# Patient Record
Sex: MALE | Race: WHITE | NOT HISPANIC OR LATINO | Employment: FULL TIME | URBAN - NONMETROPOLITAN AREA
[De-identification: names, ages, dates, MRNs, and addresses within clinical notes are randomized per-mention and may not be internally consistent; named-entity substitution may affect disease eponyms.]

---

## 2017-12-15 ENCOUNTER — OFFICE VISIT (OUTPATIENT)
Dept: FAMILY MEDICINE CLINIC | Facility: CLINIC | Age: 42
End: 2017-12-15

## 2017-12-15 VITALS
BODY MASS INDEX: 37.99 KG/M2 | HEIGHT: 65 IN | DIASTOLIC BLOOD PRESSURE: 76 MMHG | WEIGHT: 228 LBS | SYSTOLIC BLOOD PRESSURE: 122 MMHG | HEART RATE: 72 BPM

## 2017-12-15 DIAGNOSIS — Z02.89 ENCOUNTER FOR EXAMINATION REQUIRED BY DEPARTMENT OF TRANSPORTATION (DOT): Primary | ICD-10-CM

## 2017-12-15 PROCEDURE — DOTPHY: Performed by: INTERNAL MEDICINE

## 2017-12-15 NOTE — PATIENT INSTRUCTIONS

## 2017-12-15 NOTE — PROGRESS NOTES
Mark Wilson is a 42 y.o. male who presents to the office for a DOT Exam. See Federal DOT examination form for details of this visit.

## 2019-02-22 ENCOUNTER — OFFICE VISIT (OUTPATIENT)
Dept: FAMILY MEDICINE CLINIC | Facility: CLINIC | Age: 44
End: 2019-02-22

## 2019-02-22 ENCOUNTER — HOSPITAL ENCOUNTER (EMERGENCY)
Facility: HOSPITAL | Age: 44
Discharge: HOME | End: 2019-02-22
Attending: EMERGENCY MEDICINE | Admitting: EMERGENCY MEDICINE

## 2019-02-22 ENCOUNTER — APPOINTMENT (OUTPATIENT)
Dept: GENERAL RADIOLOGY | Facility: HOSPITAL | Age: 44
End: 2019-02-22

## 2019-02-22 VITALS
HEIGHT: 71 IN | SYSTOLIC BLOOD PRESSURE: 142 MMHG | BODY MASS INDEX: 29.94 KG/M2 | HEART RATE: 96 BPM | DIASTOLIC BLOOD PRESSURE: 97 MMHG | TEMPERATURE: 97.7 F | RESPIRATION RATE: 20 BRPM | WEIGHT: 213.85 LBS | OXYGEN SATURATION: 98 %

## 2019-02-22 VITALS
HEART RATE: 110 BPM | OXYGEN SATURATION: 98 % | SYSTOLIC BLOOD PRESSURE: 144 MMHG | BODY MASS INDEX: 30.24 KG/M2 | HEIGHT: 71 IN | DIASTOLIC BLOOD PRESSURE: 90 MMHG | WEIGHT: 216 LBS

## 2019-02-22 DIAGNOSIS — R53.81 MALAISE: ICD-10-CM

## 2019-02-22 DIAGNOSIS — I10 HYPERTENSION, UNSPECIFIED TYPE: Primary | ICD-10-CM

## 2019-02-22 DIAGNOSIS — R94.31 ABNORMAL EKG: ICD-10-CM

## 2019-02-22 DIAGNOSIS — R53.1 WEAKNESS: Primary | ICD-10-CM

## 2019-02-22 DIAGNOSIS — R94.31 ABNORMAL FINDING ON EKG: ICD-10-CM

## 2019-02-22 DIAGNOSIS — R03.0 ELEVATED BLOOD PRESSURE READING: ICD-10-CM

## 2019-02-22 LAB
ALBUMIN SERPL-MCNC: 4.4 G/DL (ref 3.4–4.8)
ALBUMIN/GLOB SERPL: 1.2 G/DL (ref 1.1–1.8)
ALP SERPL-CCNC: 68 U/L (ref 38–126)
ALT SERPL W P-5'-P-CCNC: 30 U/L (ref 21–72)
ANION GAP SERPL CALCULATED.3IONS-SCNC: 9 MMOL/L (ref 5–15)
AST SERPL-CCNC: 26 U/L (ref 17–59)
BASOPHILS # BLD AUTO: 0.08 10*3/MM3 (ref 0–0.2)
BASOPHILS NFR BLD AUTO: 0.7 % (ref 0–1.5)
BILIRUB SERPL-MCNC: 0.2 MG/DL (ref 0.2–1.3)
BILIRUB UR QL STRIP: NEGATIVE
BUN BLD-MCNC: 20 MG/DL (ref 7–21)
BUN/CREAT SERPL: 16.1 (ref 7–25)
CALCIUM SPEC-SCNC: 9.7 MG/DL (ref 8.4–10.2)
CHLORIDE SERPL-SCNC: 96 MMOL/L (ref 95–110)
CK MB SERPL-CCNC: 0.67 NG/ML (ref 0–5)
CK SERPL-CCNC: 61 U/L (ref 55–170)
CLARITY UR: CLEAR
CO2 SERPL-SCNC: 30 MMOL/L (ref 22–31)
COLOR UR: YELLOW
CREAT BLD-MCNC: 1.24 MG/DL (ref 0.7–1.3)
DEPRECATED RDW RBC AUTO: 40.6 FL (ref 37–54)
EOSINOPHIL # BLD AUTO: 0.39 10*3/MM3 (ref 0–0.4)
EOSINOPHIL NFR BLD AUTO: 3.5 % (ref 0.3–6.2)
ERYTHROCYTE [DISTWIDTH] IN BLOOD BY AUTOMATED COUNT: 12.4 % (ref 12.3–15.4)
ETHANOL BLD-MCNC: <10 MG/DL (ref 0–10)
ETHANOL UR QL: <0.01 %
GFR SERPL CREATININE-BSD FRML MDRD: 63 ML/MIN/1.73 (ref 63–147)
GLOBULIN UR ELPH-MCNC: 3.6 GM/DL (ref 2.3–3.5)
GLUCOSE BLD-MCNC: 87 MG/DL (ref 60–100)
GLUCOSE UR STRIP-MCNC: NEGATIVE MG/DL
HCT VFR BLD AUTO: 47.4 % (ref 37.5–51)
HGB BLD-MCNC: 16.2 G/DL (ref 13–17.7)
HGB UR QL STRIP.AUTO: NEGATIVE
IMM GRANULOCYTES # BLD AUTO: 0.11 10*3/MM3 (ref 0–0.05)
IMM GRANULOCYTES NFR BLD AUTO: 1 % (ref 0–0.5)
KETONES UR QL STRIP: NEGATIVE
LEUKOCYTE ESTERASE UR QL STRIP.AUTO: NEGATIVE
LIPASE SERPL-CCNC: 97 U/L (ref 23–300)
LYMPHOCYTES # BLD AUTO: 2.56 10*3/MM3 (ref 0.7–3.1)
LYMPHOCYTES NFR BLD AUTO: 22.8 % (ref 19.6–45.3)
MAGNESIUM SERPL-MCNC: 2.2 MG/DL (ref 1.6–2.3)
MCH RBC QN AUTO: 30.5 PG (ref 26.6–33)
MCHC RBC AUTO-ENTMCNC: 34.2 G/DL (ref 31.5–35.7)
MCV RBC AUTO: 89.3 FL (ref 79–97)
MONOCYTES # BLD AUTO: 1.52 10*3/MM3 (ref 0.1–0.9)
MONOCYTES NFR BLD AUTO: 13.5 % (ref 5–12)
NEUTROPHILS # BLD AUTO: 6.59 10*3/MM3 (ref 1.4–7)
NEUTROPHILS NFR BLD AUTO: 58.5 % (ref 42.7–76)
NITRITE UR QL STRIP: NEGATIVE
NRBC BLD AUTO-RTO: 0 /100 WBC (ref 0–0)
PH UR STRIP.AUTO: 6 [PH] (ref 5–9)
PLATELET # BLD AUTO: 297 10*3/MM3 (ref 140–450)
PMV BLD AUTO: 10.4 FL (ref 6–12)
POTASSIUM BLD-SCNC: 4.5 MMOL/L (ref 3.5–5.1)
PROT SERPL-MCNC: 8 G/DL (ref 6.3–8.6)
PROT UR QL STRIP: NEGATIVE
RBC # BLD AUTO: 5.31 10*6/MM3 (ref 4.14–5.8)
SODIUM BLD-SCNC: 135 MMOL/L (ref 137–145)
SP GR UR STRIP: 1.02 (ref 1–1.03)
TROPONIN I SERPL-MCNC: <0.012 NG/ML
UROBILINOGEN UR QL STRIP: NORMAL
WBC NRBC COR # BLD: 11.25 10*3/MM3 (ref 3.4–10.8)
WHOLE BLOOD HOLD SPECIMEN: NORMAL

## 2019-02-22 PROCEDURE — 81003 URINALYSIS AUTO W/O SCOPE: CPT | Performed by: EMERGENCY MEDICINE

## 2019-02-22 PROCEDURE — 85025 COMPLETE CBC W/AUTO DIFF WBC: CPT | Performed by: EMERGENCY MEDICINE

## 2019-02-22 PROCEDURE — 25010000002 MAGNESIUM SULFATE PER 500 MG OF MAGNESIUM: Performed by: EMERGENCY MEDICINE

## 2019-02-22 PROCEDURE — 93005 ELECTROCARDIOGRAM TRACING: CPT

## 2019-02-22 PROCEDURE — 84484 ASSAY OF TROPONIN QUANT: CPT | Performed by: EMERGENCY MEDICINE

## 2019-02-22 PROCEDURE — 96365 THER/PROPH/DIAG IV INF INIT: CPT

## 2019-02-22 PROCEDURE — 82550 ASSAY OF CK (CPK): CPT | Performed by: EMERGENCY MEDICINE

## 2019-02-22 PROCEDURE — 25010000002 THIAMINE PER 100 MG: Performed by: EMERGENCY MEDICINE

## 2019-02-22 PROCEDURE — 83690 ASSAY OF LIPASE: CPT | Performed by: EMERGENCY MEDICINE

## 2019-02-22 PROCEDURE — 99214 OFFICE O/P EST MOD 30 MIN: CPT | Performed by: PHYSICIAN ASSISTANT

## 2019-02-22 PROCEDURE — 82553 CREATINE MB FRACTION: CPT | Performed by: EMERGENCY MEDICINE

## 2019-02-22 PROCEDURE — 80307 DRUG TEST PRSMV CHEM ANLYZR: CPT | Performed by: EMERGENCY MEDICINE

## 2019-02-22 PROCEDURE — 99284 EMERGENCY DEPT VISIT MOD MDM: CPT

## 2019-02-22 PROCEDURE — 83735 ASSAY OF MAGNESIUM: CPT | Performed by: EMERGENCY MEDICINE

## 2019-02-22 PROCEDURE — 93005 ELECTROCARDIOGRAM TRACING: CPT | Performed by: EMERGENCY MEDICINE

## 2019-02-22 PROCEDURE — 93010 ELECTROCARDIOGRAM REPORT: CPT | Performed by: INTERNAL MEDICINE

## 2019-02-22 PROCEDURE — 80053 COMPREHEN METABOLIC PANEL: CPT | Performed by: EMERGENCY MEDICINE

## 2019-02-22 PROCEDURE — 71046 X-RAY EXAM CHEST 2 VIEWS: CPT

## 2019-02-22 PROCEDURE — 93000 ELECTROCARDIOGRAM COMPLETE: CPT | Performed by: PHYSICIAN ASSISTANT

## 2019-02-22 RX ORDER — SODIUM CHLORIDE 0.9 % (FLUSH) 0.9 %
10 SYRINGE (ML) INJECTION AS NEEDED
Status: DISCONTINUED | OUTPATIENT
Start: 2019-02-22 | End: 2019-02-22 | Stop reason: HOSPADM

## 2019-02-22 RX ORDER — SODIUM CHLORIDE 9 MG/ML
125 INJECTION, SOLUTION INTRAVENOUS CONTINUOUS
Status: DISCONTINUED | OUTPATIENT
Start: 2019-02-22 | End: 2019-02-22 | Stop reason: HOSPADM

## 2019-02-22 RX ADMIN — FOLIC ACID 1000 ML/HR: 5 INJECTION, SOLUTION INTRAMUSCULAR; INTRAVENOUS; SUBCUTANEOUS at 15:45

## 2019-02-22 NOTE — ED PROVIDER NOTES
"Subjective   44yo male presents ED c/o 3-4d hx generalized fatigue, noted to have elevated blood pressure at work 2d previous.  Pt seen clinic earlier today for evaluation of possible hypertension et referred to ED secondary to abnormal ekg.  Pt denies headache/fever/chills/n/v/d/chest pain/soa/abd pain.  Pt reports last etoh intake 2d previous.        History provided by:  Patient  Illness   Severity:  Mild  Onset quality:  Gradual  Duration:  4 days  Timing:  Constant  Chronicity:  New  Associated symptoms: fatigue    Associated symptoms: no headaches        Review of Systems   Constitutional: Positive for fatigue.   HENT: Negative.    Respiratory: Negative.    Cardiovascular: Negative.    Gastrointestinal: Negative.    Genitourinary: Negative.    Musculoskeletal: Negative.    Neurological: Negative for syncope, speech difficulty, numbness and headaches.   All other systems reviewed and are negative.      History reviewed. No pertinent past medical history.    No Known Allergies    Past Surgical History:   Procedure Laterality Date   • RHINOPLASTY         Family History   Problem Relation Age of Onset   • Hypertension Mother    • Hypertension Father        Social History     Socioeconomic History   • Marital status:      Spouse name: Not on file   • Number of children: Not on file   • Years of education: Not on file   • Highest education level: Not on file   Tobacco Use   • Smoking status: Current Some Day Smoker     Packs/day: 0.25     Types: Cigarettes   • Smokeless tobacco: Never Used   • Tobacco comment: \"smokes one cigarette every few days\".   Substance and Sexual Activity   • Alcohol use: No     Comment: \"Occ\"   • Drug use: No   • Sexual activity: Defer           Objective   Physical Exam   Constitutional: He is oriented to person, place, and time. He appears well-developed and well-nourished.   HENT:   Head: Normocephalic and atraumatic.   Right Ear: External ear normal.   Left Ear: External ear " normal.   Nose: Nose normal.   Mouth/Throat: Oropharynx is clear and moist.   Eyes: Pupils are equal, round, and reactive to light.   Neck: Normal range of motion. Neck supple. No JVD present. No tracheal deviation present.   Cardiovascular: Normal rate, regular rhythm, normal heart sounds and intact distal pulses. Exam reveals no gallop and no friction rub.   No murmur heard.  Pulmonary/Chest: Effort normal and breath sounds normal. No stridor. He has no wheezes. He has no rales.   Abdominal: Soft. Bowel sounds are normal. He exhibits no mass. There is no tenderness. There is no rebound and no guarding.   Musculoskeletal: He exhibits no edema.   Lymphadenopathy:     He has no cervical adenopathy.   Neurological: He is alert and oriented to person, place, and time. He has normal strength. No sensory deficit. GCS eye subscore is 4. GCS verbal subscore is 5. GCS motor subscore is 6.   Skin: Skin is warm and dry.   Nursing note and vitals reviewed.      ECG 12 Lead    Date/Time: 2/22/2019 5:22 PM  Performed by: Guero De La Torre MD  Authorized by: Guero De La Torre MD   Interpreted by physician  Rhythm: sinus tachycardia  Rate: tachycardic  BPM: 107  QRS axis: left  Conduction: conduction normal  ST Segments: ST segments normal  T Waves: T waves normal  Other findings: PRWP  Q waves: V1 and V2  Clinical impression: abnormal ECG                 ED Course  ED Course as of Feb 22 1719 Fri Feb 22, 2019   1716 Repeat bp 135/80. Pt resting comfortably. Denies physical complaints. Stable discharge.  [SD]      ED Course User Index  [SD] Guero De La Torre MD      Labs Reviewed   COMPREHENSIVE METABOLIC PANEL - Abnormal; Notable for the following components:       Result Value    Sodium 135 (*)     Globulin 3.6 (*)     All other components within normal limits   CBC WITH AUTO DIFFERENTIAL - Abnormal; Notable for the following components:    WBC 11.25 (*)     Monocyte % 13.5 (*)     Immature Grans % 1.0 (*)     Monocytes, Absolute  1.52 (*)     Immature Grans, Absolute 0.11 (*)     All other components within normal limits   LIPASE - Normal   URINALYSIS W/ MICROSCOPIC IF INDICATED (NO CULTURE) - Normal    Narrative:     Urine microscopic not indicated.   MAGNESIUM - Normal   CK - Normal   CK MB - Normal   TROPONIN (IN-HOUSE) - Normal   ETHANOL   RAINBOW DRAW    Narrative:     The following orders were created for panel order [place] Draw.  Procedure                               Abnormality         Status                     ---------                               -----------         ------                     Light Blue Top[[id number]]                                   Final result               Green Top (Gel)[[id number]]                                                             Lavender Top[[id number]]                                                                Gold Top - SST[[id number]]                                                                Please view results for these tests on the individual orders.   LIGHT BLUE TOP   CBC AND DIFFERENTIAL    Narrative:     The following orders were created for panel order CBC & Differential.  Procedure                               Abnormality         Status                     ---------                               -----------         ------                     CBC Auto Differential[[id number]]        Abnormal            Final result                 Please view results for these tests on the individual orders.   GREEN TOP   LAVENDER TOP   GOLD TOP - SST     Xr Chest 2 View    Result Date: 2/22/2019  Narrative: TWO VIEW CHEST HISTORY: Fatigue Frontal and lateral films of the chest were obtained. COMPARISON: None EKG leads. The lungs are clear of an acute process. The heart is not enlarged. The pulmonary vasculature is not increased. No pleural effusion. No pneumothorax. No acute osseous abnormality.     Impression: CONCLUSION: No Acute Disease [id number] Electronically signed by:  Christian Delgado MD   2/22/2019 4:15 PM CST Workstation: [id number]                [facility]      Final diagnoses:   Weakness   Elevated blood pressure reading   Abnormal EKG            Guero De La Torre MD  02/22/19 [id number]

## 2019-02-22 NOTE — DISCHARGE INSTRUCTIONS
Return ED fever, headache, chest pain, shortness of air, syncope, palpitations, worse condition, other concerns.

## 2019-02-22 NOTE — PROGRESS NOTES
"Noe Wilson is a 43 y.o. male.   Pt is new to me.  Hypertension   This is a new problem. The problem is uncontrolled. Associated symptoms include malaise/fatigue. Pertinent negatives include no anxiety, blurred vision, chest pain, headaches, neck pain, orthopnea, palpitations, peripheral edema, PND, shortness of breath or sweats. There are no associated agents to hypertension. Risk factors for coronary artery disease include smoking/tobacco exposure, sedentary lifestyle and obesity. Past treatments include nothing. The current treatment provides no improvement. Compliance problems include diet.    Fatigue   This is a new problem. The current episode started 1 to 4 weeks ago. The problem occurs constantly. The problem has been unchanged. Associated symptoms include fatigue. Pertinent negatives include no abdominal pain, anorexia, arthralgias, change in bowel habit, chest pain, chills, congestion, coughing, diaphoresis, fever, headaches, joint swelling, myalgias, nausea, neck pain, numbness, rash, sore throat, swollen glands, urinary symptoms, vertigo, visual change, vomiting or weakness. Nothing aggravates the symptoms. He has tried nothing for the symptoms. The treatment provided no relief.      Pt presents today with a c/c of hypertension. Blood pressure in office 144/90. Pt states he does not regularly check his blood pressure at home. Pt states he was sent home from work due to general feeling of being unwell & hypertension (152/90 at work). Pt presents today stating he \"feels as if I am drunk\" and \"not right\". Pt states he has felt \"off\" x 2 days. Pt states he has a hx of heavy alcohol use in the past drinking a fifth of whiskey per day x several months ago for several years. Pt states last alcohol use was x 2 days ago and he \"had a few sips\".     Pt admits to fatigue x 2-3 weeks.     Tobacco dependence due to cigarettes - 1/4 ppd x 5 years.       The following portions of the patient's " history were reviewed and updated as appropriate: allergies, current medications, past family history, past medical history, past social history, past surgical history and problem list.    Review of Systems   Constitutional: Positive for fatigue and malaise/fatigue. Negative for activity change, appetite change, chills, diaphoresis, fever, unexpected weight gain and unexpected weight loss.   HENT: Negative for congestion, dental problem, drooling, ear discharge, ear pain, facial swelling, hearing loss, mouth sores, nosebleeds, postnasal drip, rhinorrhea, sinus pressure, sneezing, sore throat, tinnitus, trouble swallowing and voice change.    Eyes: Negative for blurred vision, double vision and visual disturbance.   Respiratory: Negative for apnea, cough, choking, chest tightness, shortness of breath, wheezing and stridor.    Cardiovascular: Negative for chest pain, palpitations, orthopnea, leg swelling and PND.   Gastrointestinal: Negative for abdominal distention, abdominal pain, anorexia, change in bowel habit, constipation, diarrhea, nausea, vomiting, GERD and indigestion.   Endocrine: Negative.    Musculoskeletal: Negative for arthralgias, back pain, gait problem, joint swelling, myalgias, neck pain, neck stiffness and bursitis.   Skin: Negative.  Negative for rash.   Neurological: Negative for dizziness, vertigo, tremors, seizures, syncope, facial asymmetry, speech difficulty, weakness, light-headedness, numbness, headache, memory problem and confusion.   Psychiatric/Behavioral: Negative.        Objective   Physical Exam   Constitutional: He is oriented to person, place, and time. He appears well-developed and well-nourished. He is active and cooperative. He appears ill. No distress.   Pt constantly moving on exam table to find comfort.    HENT:   Head: Normocephalic and atraumatic.   Right Ear: External ear normal.   Left Ear: External ear normal.   Nose: Nose normal.   Mouth/Throat: Oropharynx is clear and  moist. No oropharyngeal exudate.   Eyes: Conjunctivae and EOM are normal. Pupils are equal, round, and reactive to light.   Neck: Normal range of motion. Neck supple. No JVD present. No tracheal deviation present. No thyromegaly present.   Cardiovascular: Normal rate, regular rhythm and normal heart sounds. Exam reveals no gallop and no friction rub.   No murmur heard.  Pulmonary/Chest: Effort normal and breath sounds normal. No stridor. No respiratory distress. He has no wheezes. He has no rales. He exhibits no tenderness.   Abdominal: Soft. Bowel sounds are normal.   Musculoskeletal: Normal range of motion.   Lymphadenopathy:     He has no cervical adenopathy.   Neurological: He is alert and oriented to person, place, and time.   Skin: Skin is warm and dry. Capillary refill takes less than 2 seconds. No rash noted. He is not diaphoretic. No erythema. No pallor.   Psychiatric: He has a normal mood and affect. His behavior is normal. Judgment and thought content normal.   Nursing note and vitals reviewed.    Vitals:    02/22/19 1255   BP: 144/90   Pulse: 110   SpO2: 98%     EKG:   Indication: hypertension, malaise   Vent Rate: 93  bpm   CLEMENT:  180  ms   QRS: 92 ms   QT/QTc:332/412  ms   Interpretation: Abnormal ekg. ST elevation in leads v2, v3, v4, possible anteroseptal infarct. Left anterior fascicular block. Possible left atrial enlargement. Normal sinus rhythm.  Previous EKG: No comparison available.      Assessment/Plan   Mark was seen today for hypertension and dizziness.    Diagnoses and all orders for this visit:    Hypertension, unspecified type  -     ECG 12 Lead    Malaise  -     ECG 12 Lead    Abnormal finding on EKG    Other orders  -     Cancel: CBC & Differential; Future  -     Cancel: Comprehensive metabolic panel; Future  -     Cancel: TSH; Future  -     Cancel: T4, free; Future  -     Cancel: Lipid panel; Future  -     Cancel: Vitamin D 25 hydroxy; Future  -     Cancel: Vitamin B12; Future  -      Cancel: Testosterone, Free, Total; Future  -     Cancel: Iron Profile; Future  -     Cancel: Ferritin; Future      Abnormal ekg/hypertension/malaise - due to pt's abnormal ekg with no previous comparison, generalized feelings of being unwell, & hypertension that is new to him - pt advised to go to ER to rule out life-threatening illness, including myocardial infarction/ischemia. Pt agreeable to arrive to ER by EMS. EMS called for transport to ER & picked up pt from office to transport.         This document has been electronically signed by Wanda George PA-C on February 24, 2019 8:18 PM,.

## 2019-02-27 ENCOUNTER — OFFICE VISIT (OUTPATIENT)
Dept: CARDIOLOGY | Facility: CLINIC | Age: 44
End: 2019-02-27

## 2019-02-27 VITALS
OXYGEN SATURATION: 99 % | SYSTOLIC BLOOD PRESSURE: 140 MMHG | HEART RATE: 109 BPM | BODY MASS INDEX: 30.53 KG/M2 | DIASTOLIC BLOOD PRESSURE: 92 MMHG | WEIGHT: 218.1 LBS | HEIGHT: 71 IN

## 2019-02-27 DIAGNOSIS — R06.83 SNORES: ICD-10-CM

## 2019-02-27 DIAGNOSIS — I10 ESSENTIAL HYPERTENSION: ICD-10-CM

## 2019-02-27 DIAGNOSIS — E66.09 CLASS 1 OBESITY DUE TO EXCESS CALORIES WITHOUT SERIOUS COMORBIDITY WITH BODY MASS INDEX (BMI) OF 30.0 TO 30.9 IN ADULT: ICD-10-CM

## 2019-02-27 DIAGNOSIS — F17.200 SMOKER: ICD-10-CM

## 2019-02-27 DIAGNOSIS — R94.31 ABNORMAL EKG: Primary | ICD-10-CM

## 2019-02-27 PROCEDURE — 99204 OFFICE O/P NEW MOD 45 MIN: CPT | Performed by: INTERNAL MEDICINE

## 2019-02-27 PROCEDURE — 99406 BEHAV CHNG SMOKING 3-10 MIN: CPT | Performed by: INTERNAL MEDICINE

## 2019-02-27 NOTE — PROGRESS NOTES
Cardiovascular Medicine      Bud Gonzalez M.D., Ph.D., [facility]           Thank you for asking me to see Mark Wilson for abnormal EKG.    History of Present Illness  This is a 43 y.o. male with:    1.  Abnormal EKG  2.  Hypertension  3.  Smoker    Mark Wilson is a 43 y.o. male who presents for consultation today.  Patient is referred from the emergency department for abnormal EKG.  The patient saw a mid-level provider and was noted to be hypertensive.  He had no resting, exertional or nocturnal angina.  No prior history of MI, CHF or CAD.  EKG was obtained and had no acute findings, but was interpreted as abnormal.  Patient was recommended by the mid-level provider to be transferred by EMS to the emergency department.  In the emergency department, aside from some fatigue, he had no other complaints.  Laboratory evaluations were unremarkable.  He was advised to follow-up with cardiology for his abnormal EKG and his primary care provider for elevated blood pressure.  He was having elevated BPs at work. He was told by his work that he needed to see to a provider. Again, when he was seen he was sent to the ED. He does tell me that he has fatigue. Other than that, he is really is asymptomatic from a CV perspective. He does not normally have claudication. Unfortunately, he does smoker. He tells me he had an elevated BP at a DOT physical, but he was passed. He does endorse EDS. He does snore.    Review of Systems - Review of Systems   Cardiovascular: Negative for chest pain, claudication, irregular heartbeat, leg swelling, near-syncope, orthopnea, palpitations, paroxysmal nocturnal dyspnea and syncope.   Respiratory: Positive for shortness of breath and snoring. Negative for cough and hemoptysis.         All other systems were reviewed and were negative.    family history includes Diabetes in his sister; Hypertension in his father, mother, and sister.     reports that he has been smoking cigarettes.   He has been smoking about 0.25 packs per day. He has quit using smokeless tobacco. His smokeless tobacco use included snuff and chew. He reports that he does not drink alcohol or use drugs.    No Known Allergies    No current outpatient medications on file.    The CVD Risk score (LISA'Agoino, et al., 2008) failed to calculate for the following reasons:    The patient has a prior MI, stroke, CHF, or peripheral vascular disease diagnosis    Physical Exam:  Vitals:    02/27/19 0849   BP: 140/92   Pulse:    SpO2:      Body mass index is 30.42 kg/m².   Pulse Pressure: within normal limits  Pulse Ox: Normal  on room air  General: alert, appears stated age and cooperative     Body Habitus: well-nourished    HEENT: Head: Normocephalic, no lesions, without obvious abnormality. No arcus senilis, xanthelasma or xanthomas.    Neuro: alert, oriented x3  speech normal in context and clarity  memory intact grossly  Pulses: 2+ and symmetric  JVP: Volume/Pulsation: Normal.  Normal waveforms.   Appropriate inspiratory decrease.  No Kussmaul's. No Jessica's.   Carotid Exam: no bruit normal pulsation bilaterally   Carotid Volume: normal.     Subclavian Bruit: absent  Vertebral Bruit: absent  Respirations: no increased work of breathing   Chest:  Normal    Pulmonary:Normal     Precordium: Normal impulses. P2 is not palpable.  RV Heave: absent  LV Heave: absent  Ryder:  normal size and placement  Palpable S4: absent.  Heart rate: normal    Heart Rhythm: regular     Heart Sounds: S1: normal intensity  S2: normal intensity  S3: absent   S4: absent  Opening Snap: absent    A2-OS:  no  Pericardial Rub:  Absent: No      Ejection click: None      Murmurs:  absent   Abdomen:   Abdominal Aorta: no bruits  Renal Arteries: no bruits  Extremity: moves all extremities equally.   LE Skin: no ecchymoses, no petechiae, no nodules, no purpura, no wounds      DATA REVIEWED:     EKG. I personally reviewed and interpreted the EKG.              TTE/ROXANA:        --------------------------------------------------------------------------------------------------  LABS:   Lab Results   Component Value Date    GLUCOSE 87 02/22/2019    BUN 20 02/22/2019    CREATININE 1.24 02/22/2019    EGFRIFNONA 63 02/22/2019    BCR 16.1 02/22/2019    K 4.5 02/22/2019    CO2 30.0 02/22/2019    CALCIUM 9.7 02/22/2019    ALBUMIN 4.40 02/22/2019    AST 26 02/22/2019    ALT 30 02/22/2019     Lab Results   Component Value Date    WBC 11.25 (H) 02/22/2019    HGB 16.2 02/22/2019    HCT 47.4 02/22/2019    MCV 89.3 02/22/2019     02/22/2019     No results found for: CHOL, CHLPL, TRIG, HDL, LDL, LDLDIRECT  No results found for: TSH, M9LFBSC, I1OMHRN, THYROIDAB  Lab Results   Component Value Date    CKTOTAL 61 02/22/2019    CKMB 0.67 02/22/2019    TROPONINI <0.012 02/22/2019     No results found for: HGBA1C  No results found for: DDIMER  Lab Results   Component Value Date    ALT 30 02/22/2019     No results found for: HGBA1C  Lab Results   Component Value Date    CREATININE 1.24 02/22/2019     No results found for: IRON, TIBC, FERRITIN  No results found for: INR, PROTIME    Assessment/Plan     1. Abnormal EKG. I suspect this is from hypertension.  He had no episodes of angina.  I would like to exclude structural heart disease.  Reassurance was provided today.    -TTE, treadmill stress test  -APRN follow-up  - recommended to follow-up with a primary care provider   2. Essential hypertension.. Blood pressures have been elevated.  He likely has stage I hypertension.  I advised him he needs to establish care with primary care provider and have additional screening labs, including being screened for dyslipidemia and type 2 diabetes.    -Establish care with PCP     3. Smoker   I advised Mark of the risks of continuing to use tobacco, and I provided him with tobacco cessation educational materials in the After Visit Summary.     During this visit, I spent >4 minutes counseling the patient regarding  tobacco cessation.       4. Snores.   -Sleep referral       Return for 1 month APRN.

## 2019-02-27 NOTE — PATIENT INSTRUCTIONS
Steps to Quit Smoking  Smoking tobacco can be harmful to your health and can affect almost every organ in your body. Smoking puts you, and those around you, at risk for developing many serious chronic diseases. Quitting smoking is difficult, but it is one of the best things that you can do for your health. It is never too late to quit.  What are the benefits of quitting smoking?  When you quit smoking, you lower your risk of developing serious diseases and conditions, such as:  · Lung cancer or lung disease, such as COPD.  · Heart disease.  · Stroke.  · Heart attack.  · Infertility.  · Osteoporosis and bone fractures.    Additionally, symptoms such as coughing, wheezing, and shortness of breath may get better when you quit. You may also find that you get sick less often because your body is stronger at fighting off colds and infections. If you are pregnant, quitting smoking can help to reduce your chances of having a baby of low birth weight.  How do I get ready to quit?  When you decide to quit smoking, create a plan to make sure that you are successful. Before you quit:  · Pick a date to quit. Set a date within the next two weeks to give you time to prepare.  · Write down the reasons why you are quitting. Keep this list in places where you will see it often, such as on your bathroom mirror or in your car or wallet.  · Identify the people, places, things, and activities that make you want to smoke (triggers) and avoid them. Make sure to take these actions:  ? Throw away all cigarettes at home, at work, and in your car.  ? Throw away smoking accessories, such as ashtrays and lighters.  ? Clean your car and make sure to empty the ashtray.  ? Clean your home, including curtains and carpets.  · Tell your family, friends, and coworkers that you are quitting. Support from your loved ones can make quitting easier.  · Talk with your health care provider about your options for quitting smoking.  · Find out what treatment  options are covered by your health insurance.    What strategies can I use to quit smoking?  Talk with your healthcare provider about different strategies to quit smoking. Some strategies include:  · Quitting smoking altogether instead of gradually lessening how much you smoke over a period of time. Research shows that quitting “cold turkey” is more successful than gradually quitting.  · Attending in-person counseling to help you build problem-solving skills. You are more likely to have success in quitting if you attend several counseling sessions. Even short sessions of 10 minutes can be effective.  · Finding resources and support systems that can help you to quit smoking and remain smoke-free after you quit. These resources are most helpful when you use them often. They can include:  ? Online chats with a counselor.  ? Telephone quitlines.  ? Printed self-help materials.  ? Support groups or group counseling.  ? Text messaging programs.  ? Mobile phone applications.  · Taking medicines to help you quit smoking. (If you are pregnant or breastfeeding, talk with your health care provider first.) Some medicines contain nicotine and some do not. Both types of medicines help with cravings, but the medicines that include nicotine help to relieve withdrawal symptoms. Your health care provider may recommend:  ? Nicotine patches, gum, or lozenges.  ? Nicotine inhalers or sprays.  ? Non-nicotine medicine that is taken by mouth.    Talk with your health care provider about combining strategies, such as taking medicines while you are also receiving in-person counseling. Using these two strategies together makes you more likely to succeed in quitting than if you used either strategy on its own.  If you are pregnant or breastfeeding, talk with your health care provider about finding counseling or other support strategies to quit smoking. Do not take medicine to help you quit smoking unless told to do so by your health care  provider.  What things can I do to make it easier to quit?  Quitting smoking might feel overwhelming at first, but there is a lot that you can do to make it easier. Take these important actions:  · Reach out to your family and friends and ask that they support and encourage you during this time. Call telephone quitlines, reach out to support groups, or work with a counselor for support.  · Ask people who smoke to avoid smoking around you.  · Avoid places that trigger you to smoke, such as bars, parties, or smoke-break areas at work.  · Spend time around people who do not smoke.  · Lessen stress in your life, because stress can be a smoking trigger for some people. To lessen stress, try:  ? Exercising regularly.  ? Deep-breathing exercises.  ? Yoga.  ? Meditating.  ? Performing a body scan. This involves closing your eyes, scanning your body from head to toe, and noticing which parts of your body are particularly tense. Purposefully relax the muscles in those areas.  · Download or purchase mobile phone or tablet apps (applications) that can help you stick to your quit plan by providing reminders, tips, and encouragement. There are many free apps, such as QuitGuide from the CDC (Centers for Disease Control and Prevention). You can find other support for quitting smoking (smoking cessation) through smokefree.gov and other websites.    How will I feel when I quit smoking?  Within the first 24 hours of quitting smoking, you may start to feel some withdrawal symptoms. These symptoms are usually most noticeable 2-3 days after quitting, but they usually do not last beyond 2-3 weeks. Changes or symptoms that you might experience include:  · Mood swings.  · Restlessness, anxiety, or irritation.  · Difficulty concentrating.  · Dizziness.  · Strong cravings for sugary foods in addition to nicotine.  · Mild weight gain.  · Constipation.  · Nausea.  · Coughing or a sore throat.  · Changes in how your medicines work in your  body.  · A depressed mood.  · Difficulty sleeping (insomnia).    After the first 2-3 weeks of quitting, you may start to notice more positive results, such as:  · Improved sense of smell and taste.  · Decreased coughing and sore throat.  · Slower heart rate.  · Lower blood pressure.  · Clearer skin.  · The ability to breathe more easily.  · Fewer sick days.    Quitting smoking is very challenging for most people. Do not get discouraged if you are not successful the first time. Some people need to make many attempts to quit before they achieve long-term success. Do your best to stick to your quit plan, and talk with your health care provider if you have any questions or concerns.  This information is not intended to replace advice given to you by your health care provider. Make sure you discuss any questions you have with your health care provider.  Document Released: 12/12/2002 Document Revised: 08/15/2017 Document Reviewed: 05/03/2016  [employer] Interactive Patient Education © 2018 [employer]  Hypertension  Hypertension is another name for high blood pressure. High blood pressure forces your heart to work harder to pump blood. This can cause problems over time.  There are two numbers in a blood pressure reading. There is a top number (systolic) over a bottom number (diastolic). It is best to have a blood pressure below 120/80. Healthy choices can help lower your blood pressure. You may need medicine to help lower your blood pressure if:  · Your blood pressure cannot be lowered with healthy choices.  · Your blood pressure is higher than 130/80.    Follow these instructions at home:  Eating and drinking  · If directed, follow the DASH eating plan. This diet includes:  ? Filling half of your plate at each meal with fruits and vegetables.  ? Filling one quarter of your plate at each meal with whole grains. Whole grains include whole wheat pasta, brown rice, and whole grain bread.  ? Eating or drinking low-fat dairy  products, such as skim milk or low-fat yogurt.  ? Filling one quarter of your plate at each meal with low-fat (lean) proteins. Low-fat proteins include fish, skinless chicken, eggs, beans, and tofu.  ? Avoiding fatty meat, cured and processed meat, or chicken with skin.  ? Avoiding premade or processed food.  · Eat less than 1,500 mg of salt (sodium) a day.  · Limit alcohol use to no more than 1 drink a day for nonpregnant women and 2 drinks a day for men. One drink equals 12 oz of beer, 5 oz of wine, or 1½ oz of hard liquor.  Lifestyle  · Work with your doctor to stay at a healthy weight or to lose weight. Ask your doctor what the best weight is for you.  · Get at least 30 minutes of exercise that causes your heart to beat faster (aerobic exercise) most days of the week. This may include walking, swimming, or biking.  · Get at least 30 minutes of exercise that strengthens your muscles (resistance exercise) at least 3 days a week. This may include lifting weights or pilates.  · Do not use any products that contain nicotine or tobacco. This includes cigarettes and e-cigarettes. If you need help quitting, ask your doctor.  · Check your blood pressure at home as told by your doctor.  · Keep all follow-up visits as told by your doctor. This is important.  Medicines  · Take over-the-counter and prescription medicines only as told by your doctor. Follow directions carefully.  · Do not skip doses of blood pressure medicine. The medicine does not work as well if you skip doses. Skipping doses also puts you at risk for problems.  · Ask your doctor about side effects or reactions to medicines that you should watch for.  Contact a doctor if:  · You think you are having a reaction to the medicine you are taking.  · You have headaches that keep coming back (recurring).  · You feel dizzy.  · You have swelling in your ankles.  · You have trouble with your vision.  Get help right away if:  · You get a very bad headache.  · You  start to feel confused.  · You feel weak or numb.  · You feel faint.  · You get very bad pain in your:  ? Chest.  ? Belly (abdomen).  · You throw up (vomit) more than once.  · You have trouble breathing.  Summary  · Hypertension is another name for high blood pressure.  · Making healthy choices can help lower blood pressure. If your blood pressure cannot be controlled with healthy choices, you may need to take medicine.  This information is not intended to replace advice given to you by your health care provider. Make sure you discuss any questions you have with your health care provider.  Document Released: 06/05/2009 Document Revised: 11/15/2017 Document Reviewed: 11/15/2017  [employer] Interactive Patient Education © 2018 [employer]    Exercise Stress Electrocardiogram  An exercise stress electrocardiogram is a test that is done to evaluate the blood supply to your heart. This test may also be called exercise stress electrocardiography. The test is done while you are walking on a treadmill. The goal of this test is to raise your heart rate. This test is done to find areas of poor blood flow to the heart by determining the extent of coronary artery disease (CAD).  CAD is defined as narrowing in one or more heart (coronary) arteries of more than 70%. If you have an abnormal test result, this may mean that you are not getting adequate blood flow to your heart during exercise. Additional testing may be needed to understand why your test was abnormal.  Tell a health care provider about:  · Any allergies you have.  · All medicines you are taking, including vitamins, herbs, eye drops, creams, and over-the-counter medicines.  · Any problems you or family members have had with anesthetic medicines.  · Any blood disorders you have.  · Any surgeries you have had.  · Any medical conditions you have.  · Possibility of pregnancy, if this applies.  What are the risks?  Generally, this is a safe procedure. However, as with any  procedure, complications can occur. Possible complications can include:  · Pain or pressure in the following areas:  ? Chest.  ? Jaw or neck.  ? Between your shoulder blades.  ? Radiating down your left arm.  · Dizziness or light-headedness.  · Shortness of breath.  · Increased or irregular heartbeats.  · Nausea or vomiting.  · Heart attack (rare).    What happens before the procedure?  · Avoid all forms of caffeine 24 hours before your test or as directed by your health care provider. This includes coffee, tea (even decaffeinated tea), caffeinated sodas, chocolate, cocoa, and certain pain medicines.  · Follow your health care provider's instructions regarding eating and drinking before the test.  · Take your medicines as directed at regular times with water unless instructed otherwise. Exceptions may include:  ? If you have diabetes, ask how you are to take your insulin or pills. It is common to adjust insulin dosing the morning of the test.  ? If you are taking beta-blocker medicines, it is important to talk to your health care provider about these medicines well before the date of your test. Taking beta-blocker medicines may interfere with the test. In some cases, these medicines need to be changed or stopped 24 hours or more before the test.  ? If you wear a nitroglycerin patch, it may need to be removed prior to the test. Ask your health care provider if the patch should be removed before the test.  · If you use an inhaler for any breathing condition, bring it with you to the test.  · If you are an outpatient, bring a snack so you can eat right after the stress phase of the test.  · Do not smoke for 4 hours prior to the test or as directed by your health care provider.  · Do not apply lotions, powders, creams, or oils on your chest prior to the test.  · Wear loose-fitting clothes and comfortable shoes for the test. This test involves walking on a treadmill.  What happens during the procedure?  · Multiple  patches (electrodes) will be put on your chest. If needed, small areas of your chest may have to be shaved to get better contact with the electrodes. Once the electrodes are attached to your body, multiple wires will be attached to the electrodes and your heart rate will be monitored.  · Your heart will be monitored both at rest and while exercising.  · You will walk on a treadmill. The treadmill will be started at a slow pace. The treadmill speed and incline will gradually be increased to raise your heart rate.  What happens after the procedure?  · Your heart rate and blood pressure will be monitored after the test.  · You may return to your normal schedule including diet, activities, and medicines, unless your health care provider tells you otherwise.  This information is not intended to replace advice given to you by your health care provider. Make sure you discuss any questions you have with your health care provider.  Document Released: 12/15/2001 Document Revised: 05/25/2017 Document Reviewed: 08/25/2014  [employer] Interactive Patient Education © 2017 [employer]  Echocardiogram  An echocardiogram, or echocardiography, uses sound waves (ultrasound) to produce an image of your heart. The echocardiogram is simple, painless, obtained within a short period of time, and offers valuable information to your health care provider. The images from an echocardiogram can provide information such as:  · Evidence of coronary artery disease (CAD).  · Heart size.  · Heart muscle function.  · Heart valve function.  · Aneurysm detection.  · Evidence of a past heart attack.  · Fluid buildup around the heart.  · Heart muscle thickening.  · Assess heart valve function.    Tell a health care provider about:  · Any allergies you have.  · All medicines you are taking, including vitamins, herbs, eye drops, creams, and over-the-counter medicines.  · Any problems you or family members have had with anesthetic medicines.  · Any blood  disorders you have.  · Any surgeries you have had.  · Any medical conditions you have.  · Whether you are pregnant or may be pregnant.  What happens before the procedure?  No special preparation is needed. Eat and drink normally.  What happens during the procedure?  · In order to produce an image of your heart, gel will be applied to your chest and a wand-like tool (transducer) will be moved over your chest. The gel will help transmit the sound waves from the transducer. The sound waves will harmlessly bounce off your heart to allow the heart images to be captured in real-time motion. These images will then be recorded.  · You may need an IV to receive a medicine that improves the quality of the pictures.  What happens after the procedure?  You may return to your normal schedule including diet, activities, and medicines, unless your health care provider tells you otherwise.  This information is not intended to replace advice given to you by your health care provider. Make sure you discuss any questions you have with your health care provider.  Document Released: 12/15/2001 Document Revised: 08/05/2017 Document Reviewed: 08/25/2014  [employer] Interactive Patient Education © 2017 [employer]

## 2019-03-07 LAB
BH CV ECHO MEAS - ACS: 2.5 CM
BH CV ECHO MEAS - AO ISTHMUS: 2.9 CM
BH CV ECHO MEAS - AO MAX PG (FULL): 3.4 MMHG
BH CV ECHO MEAS - AO MAX PG: 9.5 MMHG
BH CV ECHO MEAS - AO MEAN PG (FULL): 2 MMHG
BH CV ECHO MEAS - AO MEAN PG: 5 MMHG
BH CV ECHO MEAS - AO ROOT AREA (BSA CORRECTED): 1.7
BH CV ECHO MEAS - AO ROOT AREA: 10.8 CM^2
BH CV ECHO MEAS - AO ROOT DIAM: 3.7 CM
BH CV ECHO MEAS - AO V2 MAX: 154 CM/SEC
BH CV ECHO MEAS - AO V2 MEAN: 111 CM/SEC
BH CV ECHO MEAS - AO V2 VTI: 26.6 CM
BH CV ECHO MEAS - ASC AORTA: 3.1 CM
BH CV ECHO MEAS - AVA(I,A): 3.1 CM^2
BH CV ECHO MEAS - AVA(I,D): 3.1 CM^2
BH CV ECHO MEAS - AVA(V,A): 3.3 CM^2
BH CV ECHO MEAS - AVA(V,D): 3.3 CM^2
BH CV ECHO MEAS - BSA(HAYCOCK): 2.3 M^2
BH CV ECHO MEAS - BSA: 2.2 M^2
BH CV ECHO MEAS - BZI_BMI: 30.4 KILOGRAMS/M^2
BH CV ECHO MEAS - BZI_METRIC_HEIGHT: 180.3 CM
BH CV ECHO MEAS - BZI_METRIC_WEIGHT: 98.9 KG
BH CV ECHO MEAS - EDV(CUBED): 140.6 ML
BH CV ECHO MEAS - EDV(TEICH): 129.5 ML
BH CV ECHO MEAS - EF(CUBED): 74.4 %
BH CV ECHO MEAS - EF(TEICH): 65.9 %
BH CV ECHO MEAS - ESV(CUBED): 35.9 ML
BH CV ECHO MEAS - ESV(TEICH): 44.1 ML
BH CV ECHO MEAS - FS: 36.5 %
BH CV ECHO MEAS - IVS/LVPW: 1.1
BH CV ECHO MEAS - IVSD: 1.2 CM
BH CV ECHO MEAS - LA DIMENSION: 3.9 CM
BH CV ECHO MEAS - LA/AO: 1.1
BH CV ECHO MEAS - LV MASS(C)D: 234.6 GRAMS
BH CV ECHO MEAS - LV MASS(C)DI: 107.2 GRAMS/M^2
BH CV ECHO MEAS - LV MAX PG: 6.1 MMHG
BH CV ECHO MEAS - LV MEAN PG: 3 MMHG
BH CV ECHO MEAS - LV V1 MAX: 123 CM/SEC
BH CV ECHO MEAS - LV V1 MEAN: 79.2 CM/SEC
BH CV ECHO MEAS - LV V1 VTI: 19.7 CM
BH CV ECHO MEAS - LVIDD: 5.2 CM
BH CV ECHO MEAS - LVIDS: 3.3 CM
BH CV ECHO MEAS - LVOT AREA (M): 4.2 CM^2
BH CV ECHO MEAS - LVOT AREA: 4.2 CM^2
BH CV ECHO MEAS - LVOT DIAM: 2.3 CM
BH CV ECHO MEAS - LVPWD: 1.1 CM
BH CV ECHO MEAS - MV A MAX VEL: 64.2 CM/SEC
BH CV ECHO MEAS - MV DEC SLOPE: 447 CM/SEC^2
BH CV ECHO MEAS - MV E MAX VEL: 91.8 CM/SEC
BH CV ECHO MEAS - MV E/A: 1.4
BH CV ECHO MEAS - MV MAX PG: 2.6 MMHG
BH CV ECHO MEAS - MV MEAN PG: 1 MMHG
BH CV ECHO MEAS - MV P1/2T MAX VEL: 84.6 CM/SEC
BH CV ECHO MEAS - MV P1/2T: 55.4 MSEC
BH CV ECHO MEAS - MV V2 MAX: 81.3 CM/SEC
BH CV ECHO MEAS - MV V2 MEAN: 54.6 CM/SEC
BH CV ECHO MEAS - MV V2 VTI: 21.7 CM
BH CV ECHO MEAS - MVA P1/2T LCG: 2.6 CM^2
BH CV ECHO MEAS - MVA(P1/2T): 4 CM^2
BH CV ECHO MEAS - MVA(VTI): 3.8 CM^2
BH CV ECHO MEAS - PA MAX PG: 4.2 MMHG
BH CV ECHO MEAS - PA V2 MAX: 102 CM/SEC
BH CV ECHO MEAS - RAP SYSTOLE: 8 MMHG
BH CV ECHO MEAS - RVDD: 2.6 CM
BH CV ECHO MEAS - RVSP: 24 MMHG
BH CV ECHO MEAS - SI(AO): 130.7 ML/M^2
BH CV ECHO MEAS - SI(CUBED): 47.8 ML/M^2
BH CV ECHO MEAS - SI(LVOT): 37.4 ML/M^2
BH CV ECHO MEAS - SI(TEICH): 39 ML/M^2
BH CV ECHO MEAS - SV(AO): 286 ML
BH CV ECHO MEAS - SV(CUBED): 104.7 ML
BH CV ECHO MEAS - SV(LVOT): 81.8 ML
BH CV ECHO MEAS - SV(TEICH): 85.4 ML
BH CV ECHO MEAS - TAPSE (>1.6): 1.8 CM2
BH CV ECHO MEAS - TR MAX VEL: 195 CM/SEC
BH CV STRESS BP STAGE 1: NORMAL
BH CV STRESS BP STAGE 2: NORMAL
BH CV STRESS BP STAGE 3: NORMAL
BH CV STRESS DURATION MIN STAGE 1: 3
BH CV STRESS DURATION MIN STAGE 2: 3
BH CV STRESS DURATION MIN STAGE 3: 3
BH CV STRESS DURATION MIN STAGE 4: 3
BH CV STRESS DURATION SEC STAGE 1: 0
BH CV STRESS DURATION SEC STAGE 2: 0
BH CV STRESS DURATION SEC STAGE 3: 0
BH CV STRESS DURATION SEC STAGE 4: 0
BH CV STRESS GRADE STAGE 1: 10
BH CV STRESS GRADE STAGE 2: 12
BH CV STRESS GRADE STAGE 3: 14
BH CV STRESS GRADE STAGE 4: 16
BH CV STRESS HR STAGE 1: 121
BH CV STRESS HR STAGE 2: 133
BH CV STRESS HR STAGE 3: 153
BH CV STRESS METS STAGE 1: 5
BH CV STRESS METS STAGE 2: 7.5
BH CV STRESS METS STAGE 3: 10
BH CV STRESS METS STAGE 4: 13.5
BH CV STRESS PROTOCOL 1: NORMAL
BH CV STRESS RECOVERY BP: NORMAL MMHG
BH CV STRESS RECOVERY HR: 104 BPM
BH CV STRESS SPEED STAGE 1: 1.7
BH CV STRESS SPEED STAGE 2: 2.5
BH CV STRESS SPEED STAGE 3: 3.4
BH CV STRESS SPEED STAGE 4: 4.2
BH CV STRESS STAGE 1: 1
BH CV STRESS STAGE 2: 2
BH CV STRESS STAGE 3: 3
BH CV STRESS STAGE 4: 4
MAXIMAL PREDICTED HEART RATE: 175 BPM
MAXIMAL PREDICTED HEART RATE: 175 BPM
PERCENT MAX PREDICTED HR: 87.43 %
STRESS BASELINE BP: NORMAL MMHG
STRESS BASELINE HR: 106 BPM
STRESS PERCENT HR: 103 %
STRESS POST ESTIMATED WORKLOAD: 10.1 METS
STRESS POST EXERCISE DUR MIN: 8 MIN
STRESS POST EXERCISE DUR SEC: 26 SEC
STRESS POST PEAK BP: NORMAL MMHG
STRESS POST PEAK HR: 153 BPM
STRESS TARGET HR: 149 BPM
STRESS TARGET HR: 149 BPM

## 2019-03-28 ENCOUNTER — OFFICE VISIT (OUTPATIENT)
Dept: CARDIOLOGY | Facility: CLINIC | Age: 44
End: 2019-03-28

## 2019-03-28 VITALS
DIASTOLIC BLOOD PRESSURE: 92 MMHG | HEART RATE: 105 BPM | OXYGEN SATURATION: 99 % | BODY MASS INDEX: 29.47 KG/M2 | SYSTOLIC BLOOD PRESSURE: 124 MMHG | HEIGHT: 71 IN | WEIGHT: 210.5 LBS

## 2019-03-28 DIAGNOSIS — R25.2 MUSCLE CRAMPS: ICD-10-CM

## 2019-03-28 DIAGNOSIS — R07.2 PRECORDIAL PAIN: Primary | ICD-10-CM

## 2019-03-28 DIAGNOSIS — I10 BENIGN ESSENTIAL HTN: ICD-10-CM

## 2019-03-28 PROCEDURE — 99213 OFFICE O/P EST LOW 20 MIN: CPT | Performed by: NURSE PRACTITIONER

## 2019-03-28 NOTE — PROGRESS NOTES
Subjective:     Abnormal ECG and Results (chief complaint)      History of Present Illness  Mark Wilson is a 43 y.o. male who presents for consultation today.  Patient is referred from the emergency department for abnormal EKG.  The patient saw a mid-level provider and was noted to be hypertensive.  He had no resting, exertional or nocturnal angina.  No prior history of MI, CHF or CAD.  EKG was obtained and had no acute findings, but was interpreted as abnormal.  Patient was recommended by the mid-level provider to be transferred by EMS to the emergency department.  In the emergency department, aside from some fatigue, he had no other complaints.  Laboratory evaluations were unremarkable.  He was advised to follow-up with cardiology for his abnormal EKG and his primary care provider for elevated blood pressure.  He was having elevated BPs at work. He was told by his work that he needed to see to a provider. Again, when he was seen he was sent to the ED. He does tell me that he has fatigue. Other than that, he is really is asymptomatic from a CV perspective. He does not normally have claudication. Unfortunately, he does smoker. He tells me he had an elevated BP at a DOT physical, but he was passed. He does endorse EDS. He does snore.    Mr. Wilson returns to clinic today for test results ordered by Dr. Gonzalez for the above complaints. Letter given to patient to release back to work. He is having severe muscle cramps and feels unable to preform normal activities. Will refer to Primary Care. With recent wrist cyst and abscess, concerned for systemic cause. He is not febrile. He has 3+ pulse so I do not believe the leg pains to be claudication.       Review of Systems   Constitution: Negative for weakness and malaise/fatigue.   Cardiovascular: Negative for chest pain, dyspnea on exertion, orthopnea and palpitations.   Respiratory: Negative for cough and shortness of breath.    Neurological: Negative for  "dizziness.       No current outpatient medications on file.     No current facility-administered medications for this visit.         Objective:     Vitals:    03/28/19 1028   BP: 124/92   BP Location: Left arm   Patient Position: Sitting   Cuff Size: Adult   Pulse: 105   SpO2: 99%   Weight: 95.5 kg (210 lb 8 oz)   Height: 180.3 cm (71\")          Physical Exam   Constitutional: He is oriented to person, place, and time. He appears well-developed and well-nourished. No distress.   HENT:   Head: Normocephalic and atraumatic.   Neck: Normal range of motion. No JVD present.   Cardiovascular: Normal rate, regular rhythm, S1 normal, S2 normal, normal heart sounds and intact distal pulses.   No murmur heard.  Pulmonary/Chest: Effort normal and breath sounds normal. No respiratory distress. He has no wheezes. He has no rales.   Abdominal: Soft. Bowel sounds are normal.   Musculoskeletal: Normal range of motion. He exhibits no edema.   Neurological: He is alert and oriented to person, place, and time.   Skin: Skin is warm and dry. No erythema.   Psychiatric: He has a normal mood and affect. His behavior is normal. Judgment and thought content normal.       Cardiographics  Results for orders placed in visit on 02/27/19   Adult Transthoracic Echo Complete W/ Cont if Necessary Per Protocol    Narrative · Left ventricular systolic function is normal. Estimated EF appears to be   in the range of 56 - 60% with normal diastolic function.  · The valve exhibits sclerosis. No aortic valve regurgitation is present.   No aortic valve stenosis is present.  · There is no evidence of pericardial effusion.        No radiology results for the last 30 days.    ECG:    Stress Testing: 3/7/19  Interpretation Summary     · The patient achieved the target heart rate.  · The patient experienced no angina during the stress test.  · Blood pressure demonstrated a hypertensive response to stress. Heart rate demonstrated a normal response to " stress.  · The Duke Treadmill Score of 7.43 is consistent with a Low risk for ischemic heart disease  · Findings consistent with a normal ECG stress test.     Imaging  Chest x-ray:    Lab Review   Lab Results   Component Value Date    GLUCOSE 87 02/22/2019    BUN 20 02/22/2019    CREATININE 1.24 02/22/2019    EGFRIFNONA 63 02/22/2019    BCR 16.1 02/22/2019    K 4.5 02/22/2019    CO2 30.0 02/22/2019    CALCIUM 9.7 02/22/2019    ALBUMIN 4.40 02/22/2019    AST 26 02/22/2019    ALT 30 02/22/2019     Lab Results   Component Value Date    WBC 11.25 (H) 02/22/2019    HGB 16.2 02/22/2019    HCT 47.4 02/22/2019    MCV 89.3 02/22/2019     02/22/2019     No results found for: CHOL, CHLPL, TRIG, HDL, LDL, LDLDIRECT  No results found for: HGBA1C  No results found for: TSH  No results found for: PROBNP         The following portions of the patient's history were reviewed and updated as appropriate: allergies, current medications, past family history, past medical history, past social history, past surgical history and problem list.  Old records reviewed and pertinent information is included in the above objective data.      Assessment/Plan:      Diagnosis Plan   1. Precordial pain  Non cardiac chest pain with normal TST and echo.    BP showed normal response to exercise with peak BP at 170/92.     May return to work without BP concerns. Normal EKG tracing.    Referral to PCP. May need labs with CK or CRP done for generalized muscle complaints.   He is not on medications.         Follow up PCP

## 2022-04-11 ENCOUNTER — HOSPITAL ENCOUNTER (EMERGENCY)
Facility: HOSPITAL | Age: 47
End: 2022-04-11
Attending: STUDENT IN AN ORGANIZED HEALTH CARE EDUCATION/TRAINING PROGRAM

## 2022-04-11 RX ORDER — SODIUM CHLORIDE 0.9 % (FLUSH) 0.9 %
10 SYRINGE (ML) INJECTION AS NEEDED
Status: DISCONTINUED | OUTPATIENT
Start: 2022-04-11 | End: 2022-04-11